# Patient Record
Sex: FEMALE | Race: WHITE | Employment: FULL TIME | URBAN - NONMETROPOLITAN AREA
[De-identification: names, ages, dates, MRNs, and addresses within clinical notes are randomized per-mention and may not be internally consistent; named-entity substitution may affect disease eponyms.]

---
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
## 2019-10-18 ENCOUNTER — HOSPITAL ENCOUNTER (OUTPATIENT)
Age: 19
Discharge: HOME | End: 2019-10-18
Payer: COMMERCIAL

## 2019-10-18 ENCOUNTER — HOSPITAL ENCOUNTER (OUTPATIENT)
Dept: GENERAL RADIOLOGY | Age: 19
Discharge: HOME | End: 2019-10-18
Payer: COMMERCIAL

## 2019-10-18 DIAGNOSIS — R05.9 COUGH: ICD-10-CM

## 2019-10-18 PROCEDURE — 71046 X-RAY EXAM CHEST 2 VIEWS: CPT

## 2020-11-19 ENCOUNTER — HOSPITAL ENCOUNTER (OUTPATIENT)
Age: 20
Setting detail: SPECIMEN
Discharge: HOME | End: 2020-11-19
Payer: COMMERCIAL

## 2020-11-19 PROCEDURE — U0003 INFECTIOUS AGENT DETECTION BY NUCLEIC ACID (DNA OR RNA); SEVERE ACUTE RESPIRATORY SYNDROME CORONAVIRUS 2 (SARS-COV-2) (CORONAVIRUS DISEASE [COVID-19]), AMPLIFIED PROBE TECHNIQUE, MAKING USE OF HIGH THROUGHPUT TECHNOLOGIES AS DESCRIBED BY CMS-2020-01-R: HCPCS

## 2020-11-21 LAB — SARS-COV-2: DETECTED

## 2022-03-31 ENCOUNTER — APPOINTMENT (OUTPATIENT)
Dept: GENERAL RADIOLOGY | Age: 22
End: 2022-03-31
Payer: COMMERCIAL

## 2022-03-31 ENCOUNTER — HOSPITAL ENCOUNTER (EMERGENCY)
Age: 22
Discharge: HOME | End: 2022-03-31
Attending: EMERGENCY MEDICINE
Payer: COMMERCIAL

## 2022-03-31 VITALS
WEIGHT: 180 LBS | HEIGHT: 62 IN | DIASTOLIC BLOOD PRESSURE: 79 MMHG | RESPIRATION RATE: 15 BRPM | TEMPERATURE: 100.5 F | SYSTOLIC BLOOD PRESSURE: 121 MMHG | HEART RATE: 114 BPM | BODY MASS INDEX: 33.13 KG/M2 | OXYGEN SATURATION: 97 %

## 2022-03-31 DIAGNOSIS — R00.0 TACHYCARDIA: ICD-10-CM

## 2022-03-31 DIAGNOSIS — J06.9 VIRAL UPPER RESPIRATORY TRACT INFECTION: Primary | ICD-10-CM

## 2022-03-31 LAB
ALBUMIN SERPL-MCNC: 4.4 G/DL (ref 3.5–5.1)
ALP BLD-CCNC: 64 U/L (ref 38–126)
ALT SERPL-CCNC: 9 U/L (ref 11–66)
AMPHETAMINE+METHAMPHETAMINE URINE SCREEN: NEGATIVE
ANION GAP SERPL CALCULATED.3IONS-SCNC: 15 MEQ/L (ref 8–16)
AST SERPL-CCNC: 17 U/L (ref 5–40)
BACTERIA: ABNORMAL /HPF
BARBITURATE QUANTITATIVE URINE: NEGATIVE
BASOPHILS # BLD: 0.7 %
BASOPHILS ABSOLUTE: 0.1 THOU/MM3 (ref 0–0.1)
BENZODIAZEPINE QUANTITATIVE URINE: NEGATIVE
BILIRUB SERPL-MCNC: 0.6 MG/DL (ref 0.3–1.2)
BILIRUBIN URINE: NEGATIVE
BLOOD, URINE: NEGATIVE
BUN BLDV-MCNC: 15 MG/DL (ref 7–22)
C-REACTIVE PROTEIN: 2.21 MG/DL (ref 0–1)
CALCIUM SERPL-MCNC: 9.8 MG/DL (ref 8.5–10.5)
CANNABINOID QUANTITATIVE URINE: NEGATIVE
CASTS 2: ABNORMAL /LPF
CASTS UA: ABNORMAL /LPF
CHARACTER, URINE: CLEAR
CHLORIDE BLD-SCNC: 104 MEQ/L (ref 98–111)
CO2: 20 MEQ/L (ref 23–33)
COCAINE METABOLITE QUANTITATIVE URINE: NEGATIVE
COLOR: YELLOW
CREAT SERPL-MCNC: 0.9 MG/DL (ref 0.4–1.2)
CRYSTALS, UA: ABNORMAL
EKG ATRIAL RATE: 150 BPM
EKG P AXIS: 63 DEGREES
EKG P-R INTERVAL: 130 MS
EKG Q-T INTERVAL: 328 MS
EKG QRS DURATION: 76 MS
EKG QTC CALCULATION (BAZETT): 518 MS
EKG R AXIS: 83 DEGREES
EKG T AXIS: 49 DEGREES
EKG VENTRICULAR RATE: 150 BPM
EOSINOPHIL # BLD: 0.5 %
EOSINOPHILS ABSOLUTE: 0 THOU/MM3 (ref 0–0.4)
EPITHELIAL CELLS, UA: ABNORMAL /HPF
ERYTHROCYTE [DISTWIDTH] IN BLOOD BY AUTOMATED COUNT: 12.6 % (ref 11.5–14.5)
ERYTHROCYTE [DISTWIDTH] IN BLOOD BY AUTOMATED COUNT: 41.1 FL (ref 35–45)
FLU A ANTIGEN: NEGATIVE
FLU B ANTIGEN: NEGATIVE
GFR SERPL CREATININE-BSD FRML MDRD: 78 ML/MIN/1.73M2
GLUCOSE BLD-MCNC: 115 MG/DL (ref 70–108)
GLUCOSE URINE: NEGATIVE MG/DL
GROUP A STREP CULTURE, REFLEX: NEGATIVE
HCT VFR BLD CALC: 44 % (ref 37–47)
HEMOGLOBIN: 14.8 GM/DL (ref 12–16)
IMMATURE GRANS (ABS): 0.05 THOU/MM3 (ref 0–0.07)
IMMATURE GRANULOCYTES: 0.5 %
KETONES, URINE: NEGATIVE
LEUKOCYTE ESTERASE, URINE: ABNORMAL
LYMPHOCYTES # BLD: 6.9 %
LYMPHOCYTES ABSOLUTE: 0.7 THOU/MM3 (ref 1–4.8)
MCH RBC QN AUTO: 30.3 PG (ref 26–33)
MCHC RBC AUTO-ENTMCNC: 33.6 GM/DL (ref 32.2–35.5)
MCV RBC AUTO: 90 FL (ref 81–99)
MISCELLANEOUS 2: ABNORMAL
MONOCYTES # BLD: 8 %
MONOCYTES ABSOLUTE: 0.8 THOU/MM3 (ref 0.4–1.3)
NITRITE, URINE: NEGATIVE
NUCLEATED RED BLOOD CELLS: 0 /100 WBC
OPIATES, URINE: NEGATIVE
OSMOLALITY CALCULATION: 279.3 MOSMOL/KG (ref 275–300)
OXYCODONE: NEGATIVE
PH UA: 7 (ref 5–9)
PHENCYCLIDINE QUANTITATIVE URINE: NEGATIVE
PLATELET # BLD: 270 THOU/MM3 (ref 130–400)
PMV BLD AUTO: 9.2 FL (ref 9.4–12.4)
POTASSIUM SERPL-SCNC: 3.6 MEQ/L (ref 3.5–5.2)
PREGNANCY, SERUM: NEGATIVE
PROCALCITONIN: 0.04 NG/ML (ref 0.01–0.09)
PROTEIN UA: NEGATIVE
RBC # BLD: 4.89 MILL/MM3 (ref 4.2–5.4)
RBC URINE: ABNORMAL /HPF
REFLEX THROAT C + S: NORMAL
RENAL EPITHELIAL, UA: ABNORMAL
SARS-COV-2, NAAT: NOT  DETECTED
SEDIMENTATION RATE, ERYTHROCYTE: 1 MM/HR (ref 0–20)
SEG NEUTROPHILS: 83.4 %
SEGMENTED NEUTROPHILS ABSOLUTE COUNT: 7.9 THOU/MM3 (ref 1.8–7.7)
SODIUM BLD-SCNC: 139 MEQ/L (ref 135–145)
SPECIFIC GRAVITY, URINE: 1.01 (ref 1–1.03)
TOTAL PROTEIN: 7.5 G/DL (ref 6.1–8)
TROPONIN T: < 0.01 NG/ML
TSH SERPL DL<=0.05 MIU/L-ACNC: 0.89 UIU/ML (ref 0.4–4.2)
UROBILINOGEN, URINE: 0.2 EU/DL (ref 0–1)
WBC # BLD: 9.5 THOU/MM3 (ref 4.8–10.8)
WBC UA: ABNORMAL /HPF
YEAST: ABNORMAL

## 2022-03-31 PROCEDURE — 36415 COLL VENOUS BLD VENIPUNCTURE: CPT

## 2022-03-31 PROCEDURE — 84443 ASSAY THYROID STIM HORMONE: CPT

## 2022-03-31 PROCEDURE — 80307 DRUG TEST PRSMV CHEM ANLYZR: CPT

## 2022-03-31 PROCEDURE — 87070 CULTURE OTHR SPECIMN AEROBIC: CPT

## 2022-03-31 PROCEDURE — 84145 PROCALCITONIN (PCT): CPT

## 2022-03-31 PROCEDURE — 84703 CHORIONIC GONADOTROPIN ASSAY: CPT

## 2022-03-31 PROCEDURE — 96360 HYDRATION IV INFUSION INIT: CPT

## 2022-03-31 PROCEDURE — 81001 URINALYSIS AUTO W/SCOPE: CPT

## 2022-03-31 PROCEDURE — 6370000000 HC RX 637 (ALT 250 FOR IP): Performed by: EMERGENCY MEDICINE

## 2022-03-31 PROCEDURE — 85651 RBC SED RATE NONAUTOMATED: CPT

## 2022-03-31 PROCEDURE — 99285 EMERGENCY DEPT VISIT HI MDM: CPT

## 2022-03-31 PROCEDURE — 93010 ELECTROCARDIOGRAM REPORT: CPT | Performed by: NUCLEAR MEDICINE

## 2022-03-31 PROCEDURE — 86140 C-REACTIVE PROTEIN: CPT

## 2022-03-31 PROCEDURE — 71045 X-RAY EXAM CHEST 1 VIEW: CPT

## 2022-03-31 PROCEDURE — 93005 ELECTROCARDIOGRAM TRACING: CPT | Performed by: EMERGENCY MEDICINE

## 2022-03-31 PROCEDURE — 84484 ASSAY OF TROPONIN QUANT: CPT

## 2022-03-31 PROCEDURE — 87804 INFLUENZA ASSAY W/OPTIC: CPT

## 2022-03-31 PROCEDURE — 96361 HYDRATE IV INFUSION ADD-ON: CPT

## 2022-03-31 PROCEDURE — 87880 STREP A ASSAY W/OPTIC: CPT

## 2022-03-31 PROCEDURE — 87635 SARS-COV-2 COVID-19 AMP PRB: CPT

## 2022-03-31 PROCEDURE — 85025 COMPLETE CBC W/AUTO DIFF WBC: CPT

## 2022-03-31 PROCEDURE — 80053 COMPREHEN METABOLIC PANEL: CPT

## 2022-03-31 PROCEDURE — 87040 BLOOD CULTURE FOR BACTERIA: CPT

## 2022-03-31 PROCEDURE — 2580000003 HC RX 258: Performed by: EMERGENCY MEDICINE

## 2022-03-31 RX ORDER — 0.9 % SODIUM CHLORIDE 0.9 %
2000 INTRAVENOUS SOLUTION INTRAVENOUS ONCE
Status: COMPLETED | OUTPATIENT
Start: 2022-03-31 | End: 2022-03-31

## 2022-03-31 RX ORDER — ACETAMINOPHEN 325 MG/1
650 TABLET ORAL ONCE
Status: COMPLETED | OUTPATIENT
Start: 2022-03-31 | End: 2022-03-31

## 2022-03-31 RX ADMIN — ACETAMINOPHEN 650 MG: 325 TABLET ORAL at 08:57

## 2022-03-31 RX ADMIN — SODIUM CHLORIDE 2000 ML: 9 INJECTION, SOLUTION INTRAVENOUS at 08:56

## 2022-03-31 NOTE — ED NOTES
Patient resting in bed. Respirations easy and unlabored. No distress noted. Call light within reach.        Andrea Zaldivar RN  03/31/22 [id number]

## 2022-03-31 NOTE — ED PROVIDER NOTES
[facility] ENCOUNTER          Pt Name: Aiden Castillo  MRN: [id number]  [place] 2000  Date of evaluation: 3/31/2022  Treating Resident Physician: Gisselle Charlton MD  Supervising Physician: Cierra Oneal MD    [address] [place] [address]       Chief Complaint   Patient presents with    Palpitations     History obtained from chart review and the patient. HISTORY OF PRESENT ILLNESS      Aiden Castillo is a 20 y.o. female w/Hx of COVID-19 infection in 2020 and remote Hx of Lumbago, otherwise unremarkable who presents to the [facility] ED c/o palpitations. Pt states that she woke up this AM around 0600 and was having some lightheadedness and her heart \"felt like lt was racing\". She put her Apple watch on and said her HR was in the 140's, then after she went upstairs it was in the 180's. Pt called her mother and was instructed to come to the ED for evaluation. Of note, Pt states she was having a dry, hacking cough that started last night that is a/w CP/backpain when coughing. Pt felt \"hot\" at home, but was unsure if running a fever. Pt's mother states that the father is at home sick w/similar symptoms of dry cough and fever. Pt denies EtOH or OTC/illicit drug use. Pt denies LOC, HA, blurry vision, N/V/D, dizziness, dysuria, foul smelling urine. REVIEW OF SYSTEMS   Review of Systems   All other systems reviewed and are negative. PAST MEDICAL AND SURGICAL HISTORY   No past medical history on file. No past surgical history on file. MEDICATIONS   No current facility-administered medications for this encounter. No current outpatient medications on file.     SOCIAL HISTORY     Social History     Social History Narrative    Not on file     Social History     Tobacco Use    Smoking status: Not on file    Smokeless tobacco: Not on file   Substance Use Topics    Alcohol use: Not on file    Drug use: Not on file     ALLERGIES   No Known Allergies    FAMILY HISTORY No family history on file. PREVIOUS RECORDS   Previous records reviewed: This is this patient's first visit to [facility] ED, no previous records available on EMR. .    PHYSICAL EXAM     ED Triage Vitals [03/31/22 0822]   BP Temp Temp src Pulse Resp SpO2 Height Weight   (!) 132/92 -- -- 150 16 98 % 5' 2\" (1.575 m) 180 lb (81.6 kg)     Initial vital signs and nursing assessment reviewed and abnormal from . Body mass index is 32.92 kg/m². Pulsoximetry is normal per my interpretation. Additional Vital Signs:  Vitals:    03/31/22 1144   BP:    Pulse: 114   Resp:    Temp:    SpO2: 97%     Physical Exam  Constitutional:       General: She is not in acute distress. Appearance: Normal appearance. She is not ill-appearing, toxic-appearing or diaphoretic. HENT:      Head: Normocephalic and atraumatic. Right Ear: External ear normal.      Left Ear: External ear normal.      Nose: Nose normal. No congestion or rhinorrhea. Mouth/Throat:      Mouth: Mucous membranes are moist.      Pharynx: Oropharynx is clear. No oropharyngeal exudate or posterior oropharyngeal erythema. Eyes:      Extraocular Movements: Extraocular movements intact. Conjunctiva/sclera: Conjunctivae normal.      Pupils: Pupils are equal, round, and reactive to light. Cardiovascular:      Rate and Rhythm: Regular rhythm. Tachycardia present. Pulses: Normal pulses. Heart sounds: Normal heart sounds. No murmur heard. No friction rub. No gallop. Pulmonary:      Effort: Pulmonary effort is normal. No respiratory distress. Breath sounds: Normal breath sounds. No stridor. No wheezing. Abdominal:      General: Abdomen is flat. Bowel sounds are normal.      Palpations: Abdomen is soft. Tenderness: There is no abdominal tenderness. There is no guarding or rebound. Musculoskeletal:         General: No swelling or tenderness. Normal range of motion. Cervical back: Normal range of motion and neck supple.  No tenderness. Lymphadenopathy:      Cervical: No cervical adenopathy. Skin:     General: Skin is warm and dry. Capillary Refill: Capillary refill takes less than 2 seconds. Coloration: Skin is not pale. Findings: No rash. Neurological:      General: No focal deficit present. Mental Status: She is alert and oriented to person, place, and time. Cranial Nerves: No cranial nerve deficit. Sensory: No sensory deficit. Motor: No weakness. Psychiatric:         Mood and Affect: Mood normal.         Behavior: Behavior normal.         Thought Content: Thought content normal.         Judgment: Judgment normal.       MEDICAL DECISION MAKING   Initial Assessment:   1. 23yo F w/unremarkable PMHx presenting for new onset palpitations. Dry cough starting last night, febrile at 100.5. -150 while being evaluated in room resting in bed. SIRS 2/4 (+), Physical exam otherwise unremarkable. Father is at home sick as well w/similar symptoms. DDx include Viral URI, PNA, UTI, POTS/IST, Lisa/Myocaarditis, drugs/EtOH, Dehydration, electrolyte abnormality, Hyperthyroidism, underlying cardiac arrhythmia/issue, less likely PE, but still a possibility. Well's criteria for PE score of 1.5. Plan:    CBC, CMP, TSH w/reflex, troponin, HCG, ESR/CRP   Blood cultures x2, Procal, COVID-19, Influenza A/B, Group A Strep   UA w/reflex   IVF - NS 1L Bolus x2   Tylenol for fever    - Upon re-evaluation after the IVF and labs drawn, pt is resting in bed and doing well. HR trending down into the 110s-120s. Given the absence of apparent triggers, EtOH, nicotine, drugs, labs re-assuring, and pt still remains tachycardic in the 120s-130s, consideration for POTS vs IST comes into the picture. IST seems more likely given a possible unidentified viral URI, lightheadedness, no identifiable trigger, and sustained tachy even at rest/regardless of body position, that has lasted since at least 0600.  She remains tachycardic despite IVF resuscitation w/2L NS, but she is completely Asx. Pt HR trended down into the low 110's and she is stable and safe for d/c home at this time. ED RESULTS   Laboratory results:  Labs Reviewed   CBC WITH AUTO DIFFERENTIAL - Abnormal; Notable for the following components:       Result Value    MPV 9.2 (*)     Segs Absolute 7.9 (*)     Lymphocytes Absolute 0.7 (*)     All other components within normal limits   COMPREHENSIVE METABOLIC PANEL - Abnormal; Notable for the following components:    Glucose 115 (*)     CO2 20 (*)     ALT 9 (*)     All other components within normal limits   GLOMERULAR FILTRATION RATE, ESTIMATED - Abnormal; Notable for the following components:    Est, Glom Filt Rate 78 (*)     All other components within normal limits   URINE WITH REFLEXED MICRO - Abnormal; Notable for the following components:    Leukocyte Esterase, Urine TRACE (*)     All other components within normal limits   C-REACTIVE PROTEIN - Abnormal; Notable for the following components:    CRP 2.21 (*)     All other components within normal limits   RAPID INFLUENZA A/B ANTIGENS   COVID-19, RAPID   CULTURE, BLOOD 1   CULTURE, BLOOD 2   CULTURE, THROAT    Narrative:     Source: Specimen not received       Site:           Current Antibiotics:   GROUP A STREP, REFLEX   PROCALCITONIN   TSH WITH REFLEX   TROPONIN   HCG, SERUM, QUALITATIVE   ANION GAP   OSMOLALITY   URINE DRUG SCREEN   SEDIMENTATION RATE       Radiologic studies results:  XR CHEST PORTABLE   Final Result   Stable radiographic appearance of the chest. No evidence of an acute process. **This report has been created using voice recognition software. It may contain minor errors which are inherent in voice recognition technology. **      Final report electronically signed by Dr. Landon Haney MD on 3/31/2022 9:01 AM          ED Medications administered this visit:   Medications   0.9 % sodium chloride bolus (0 mLs IntraVENous Stopped 3/31/22 1057)   acetaminophen (TYLENOL) tablet 650 mg (650 mg Oral Given 3/31/22 0857)     ED COURSE     ED Course as of 03/31/22 1331   Thu Mar 31, 2022   0902 CBC with Auto Differential(!):    WBC 9.5   RBC 4.89   Hemoglobin Quant 14.8   Hematocrit 44.0   MCV 90.0   MCH 30.3   MCHC 33.6   RDW-CV 12.6   RDW-SD 41.1   Platelet Count 731   MPV 9.2(!)   Seg Neutrophils 83.4   Lymphocytes 6.9   Monocytes 8.0   Eosinophils 0.5   Basophils 0.7   Immature Granulocytes 0.5   Segs Absolute 7.9(!)   Lymphocytes Absolute 0.7(!)   Monocytes Absolute 0.8   Eosinophils Absolute 0.0   Basophils Absolute 0.1   Immature Grans (Abs) 0.05   Nucleated Red Blood Cells 0 [CH]   0911 Comprehensive Metabolic Panel(!):    GLUCOSE, FASTING,(!)   Creatinine 0.9   BUN,BUNPL 15   Sodium 139   Potassium 3.6   Chloride 104   CO2 20(!)   CALCIUM, SERUM, 637316 9.8   AST 17   Alk Phos 64   Total Protein 7.5   Albumin 4.4   Bilirubin 0.6   ALT 9(!) [CH]   0911 Glomerular Filtration Rate, Estimated(!):    Est, Glom Filt Rate 78(!) [CH]   0911 HCG Qualitative, Serum:    Preg, Serum NEGATIVE [CH]   0915 Procalcitonin:    Procalcitonin 0.04 [CH]   0915 Troponin:    Troponin T < 0.010 [CH]   0915 TSH with Reflex:    TSH 0.885 [CH]   1002 Urine with Reflexed Micro(!):    Glucose, UA NEGATIVE   Bilirubin, Urine NEGATIVE   Ketones, Urine NEGATIVE   Specific Gravity, Urine 1.009   Blood, Urine NEGATIVE   pH, UA 7.0   Protein, UA NEGATIVE   Urobilinogen, Urine 0.2   Nitrite, Urine NEGATIVE   Leukocyte Esterase, Urine TRACE(!)   Color, UA YELLOW   Character, Urine CLEAR   RBC, UA 3-5   WBC, UA 2-4   Epithelial Cells, UA 3-5   Bacteria, UA NONE SEEN   Casts UA NONE SEEN   Crystals, UA NONE SEEN   Renal Epithelial, UA NONE SEEN   Yeast, Urine NONE SEEN   CASTS 2 NONE SEEN   MISCELLANEOUS 2 NONE SEEN [CH]   1002 Group A Strep, Reflex:    GROUP A STREP CULTURE, REFLEX Negative [CH]   1002 COVID-19, Rapid:    SARS-CoV-2, NAAT NOT  DETECTED [CH] 1002 Rapid influenza A/B antigens:    Flu A Antigen Negative   Flu B Antigen Negative [CH]   1116 C-Reactive Protein(!):    CRP 2.21(!) [CH]      ED Course User Index  [] Bertis Fabry, MD     Strict return precautions and follow up instructions were discussed with the patient prior to discharge, with which the patient agrees. MEDICATION CHANGES     There are no discharge medications for this patient. FINAL DISPOSITION     Final diagnoses:   Viral upper respiratory tract infection   Tachycardia     Condition: condition: stable  Dispo: Discharge to home      This transcription was electronically signed. Parts of this transcriptions may have been dictated by use of voice recognition software and electronically transcribed, and parts may have been transcribed with the assistance of an ED scribe. The transcription may contain errors not detected in proofreading. Please refer to my supervising physician's documentation if my documentation differs.     Electronically Signed: Bertis Fabry, MD, 03/31/22, 1:31 PM         Bertis Fabry, MD  Resident  03/31/22 9533

## 2022-03-31 NOTE — ED TRIAGE NOTES
Presents to ED with c/o palpitations. States it started at 0615 this morning. Patient put on her apple watch and it said her HR was 180 when she walked up stairs. Alert and oriented. Respirations easy and unlabored. EKG complete. IV in place.

## 2022-03-31 NOTE — Clinical Note
Jose F Garcia was seen and treated in our emergency department on 3/31/2022. She may return to work on 04/01/2022. If you have any questions or concerns, please don't hesitate to call.       Migue Coles MD

## 2022-03-31 NOTE — ED NOTES
Patient resting in bed. Respirations easy and unlabored. No distress noted. Call light within reach.        Robert Johnson RN  03/31/22 6146

## 2022-03-31 NOTE — ED PROVIDER NOTES
PATIENT NAME: Jessica Ortiz  MRN: [id number]  : 2000  BARRIOS: 3/31/2022      I personally saw and examined the patient. I have reviewed and agreed with the resident physician's findings including all diagnostic interpretations and treatment plans as written. Please see resident physician's chart for detailed history of present illness, physical exam and medical decision making. I was present for the key portions of any procedures performed and the inclusive time noted in any critical care statement. MEDICAL DEDISION MAKING (MDM)     Jessica Ortiz is a 20 y.o. female who presents to Emergency Department with Palpitations     Patient was brought in for evaluation of racing heart and low-grade fever. Physical exam shows patient's T is 100.5 Fahrenheit, and he is tachycardic with heart rate 150/min. Lungs clear to auscultation bilaterally. Abdomen is soft. ED work-ups do not reveal patient has serious bacterial infection. Most likely patient is a virus infection. Patient's tachycardia improved after ED treatment including saline bolus and acetaminophen. Patient family reassured. Discharged with PCP follow-up pain 2-3 days.     Medications   0.9 % sodium chloride bolus (0 mLs IntraVENous Stopped 3/31/22 1057)   acetaminophen (TYLENOL) tablet 650 mg (650 mg Oral Given 3/31/22 0857)         Vitals:    22 0942 22 1017 22 1058 22 1144   BP: 120/87 129/76 121/79    Pulse: 130  125 114   Resp: 16 16 15    Temp:       TempSrc:       SpO2: 100% 100% 100% 97%   Weight:       Height:           Labs Reviewed   CBC WITH AUTO DIFFERENTIAL - Abnormal; Notable for the following components:       Result Value    MPV 9.2 (*)     Segs Absolute 7.9 (*)     Lymphocytes Absolute 0.7 (*)     All other components within normal limits   COMPREHENSIVE METABOLIC PANEL - Abnormal; Notable for the following components:    Glucose 115 (*)     CO2 20 (*)     ALT 9 (*)     All other components within normal limits   GLOMERULAR FILTRATION RATE, ESTIMATED - Abnormal; Notable for the following components:    Est, Glom Filt Rate 78 (*)     All other components within normal limits   URINE WITH REFLEXED MICRO - Abnormal; Notable for the following components:    Leukocyte Esterase, Urine TRACE (*)     All other components within normal limits   C-REACTIVE PROTEIN - Abnormal; Notable for the following components:    CRP 2.21 (*)     All other components within normal limits   RAPID INFLUENZA A/B ANTIGENS   COVID-19, RAPID   CULTURE, BLOOD 1   CULTURE, BLOOD 2   CULTURE, THROAT    Narrative:     Source: Specimen not received       Site:           Current Antibiotics:   GROUP A STREP, REFLEX   PROCALCITONIN   TSH WITH REFLEX   TROPONIN   HCG, SERUM, QUALITATIVE   ANION GAP   OSMOLALITY   URINE DRUG SCREEN   SEDIMENTATION RATE       XR CHEST PORTABLE   Final Result   Stable radiographic appearance of the chest. No evidence of an acute process. **This report has been created using voice recognition software. It may contain minor errors which are inherent in voice recognition technology. **      Final report electronically signed by Dr. Kelvin Penn MD on 3/31/2022 9:01 AM            FINAL IMPRESSION AND DISPOSITION      1. Viral upper respiratory tract infection    2. Tachycardia        DISPOSITION Decision To Discharge 03/31/2022 11:41:33 AM        PATIENT REFERRED TO:  Sanford Damon MD  [address]    Schedule an appointment as soon as possible for a visit in 1 week  For ED discharge follow up      DISCHARGE MEDICATIONS:  There are no discharge medications for this patient.       (Please note that portions of this note were completed with a voice recognition program.  Efforts were made to edit the dictations but occasionally words aremis-transcribed.)    MD Alyse Verdugo MD  03/31/22 7613

## 2022-04-02 LAB — THROAT/NOSE CULTURE: NORMAL

## 2022-04-05 LAB
BLOOD CULTURE, ROUTINE: NORMAL
BLOOD CULTURE, ROUTINE: NORMAL

## 2022-05-31 ENCOUNTER — HOSPITAL ENCOUNTER (OUTPATIENT)
Age: 22
Discharge: HOME | End: 2022-05-31
Payer: COMMERCIAL

## 2022-05-31 LAB
ALBUMIN SERPL-MCNC: 4.4 G/DL (ref 3.5–5.1)
ALP BLD-CCNC: 64 U/L (ref 38–126)
ALT SERPL-CCNC: 8 U/L (ref 11–66)
ANION GAP SERPL CALCULATED.3IONS-SCNC: 11 MEQ/L (ref 8–16)
AST SERPL-CCNC: 17 U/L (ref 5–40)
BASOPHILS # BLD: 0.7 %
BASOPHILS ABSOLUTE: 0.1 THOU/MM3 (ref 0–0.1)
BILIRUB SERPL-MCNC: 0.6 MG/DL (ref 0.3–1.2)
BUN BLDV-MCNC: 14 MG/DL (ref 7–22)
CALCIUM SERPL-MCNC: 9.1 MG/DL (ref 8.5–10.5)
CHLORIDE BLD-SCNC: 105 MEQ/L (ref 98–111)
CO2: 22 MEQ/L (ref 23–33)
CREAT SERPL-MCNC: 0.7 MG/DL (ref 0.4–1.2)
EOSINOPHIL # BLD: 1.4 %
EOSINOPHILS ABSOLUTE: 0.1 THOU/MM3 (ref 0–0.4)
ERYTHROCYTE [DISTWIDTH] IN BLOOD BY AUTOMATED COUNT: 12.3 % (ref 11.5–14.5)
ERYTHROCYTE [DISTWIDTH] IN BLOOD BY AUTOMATED COUNT: 40.8 FL (ref 35–45)
GFR SERPL CREATININE-BSD FRML MDRD: > 90 ML/MIN/1.73M2
GLUCOSE BLD-MCNC: 86 MG/DL (ref 70–108)
HCT VFR BLD CALC: 42.5 % (ref 37–47)
HEMOGLOBIN: 14.5 GM/DL (ref 12–16)
IMMATURE GRANS (ABS): 0.01 THOU/MM3 (ref 0–0.07)
IMMATURE GRANULOCYTES: 0.1 %
LYMPHOCYTES # BLD: 29.7 %
LYMPHOCYTES ABSOLUTE: 2.6 THOU/MM3 (ref 1–4.8)
MCH RBC QN AUTO: 30.6 PG (ref 26–33)
MCHC RBC AUTO-ENTMCNC: 34.1 GM/DL (ref 32.2–35.5)
MCV RBC AUTO: 89.7 FL (ref 81–99)
MONOCYTES # BLD: 5.1 %
MONOCYTES ABSOLUTE: 0.4 THOU/MM3 (ref 0.4–1.3)
NUCLEATED RED BLOOD CELLS: 0 /100 WBC
PLATELET # BLD: 325 THOU/MM3 (ref 130–400)
PMV BLD AUTO: 9.3 FL (ref 9.4–12.4)
POTASSIUM SERPL-SCNC: 3.8 MEQ/L (ref 3.5–5.2)
RBC # BLD: 4.74 MILL/MM3 (ref 4.2–5.4)
SARS-COV-2 ANTIBODY, TOTAL: POSITIVE
SEG NEUTROPHILS: 63 %
SEGMENTED NEUTROPHILS ABSOLUTE COUNT: 5.5 THOU/MM3 (ref 1.8–7.7)
SODIUM BLD-SCNC: 138 MEQ/L (ref 135–145)
TOTAL PROTEIN: 6.9 G/DL (ref 6.1–8)
TSH SERPL DL<=0.05 MIU/L-ACNC: 1.56 UIU/ML (ref 0.4–4.2)
WBC # BLD: 8.8 THOU/MM3 (ref 4.8–10.8)

## 2022-05-31 PROCEDURE — 84443 ASSAY THYROID STIM HORMONE: CPT

## 2022-05-31 PROCEDURE — 86769 SARS-COV-2 COVID-19 ANTIBODY: CPT

## 2022-05-31 PROCEDURE — 80053 COMPREHEN METABOLIC PANEL: CPT

## 2022-05-31 PROCEDURE — 85025 COMPLETE CBC W/AUTO DIFF WBC: CPT

## 2022-05-31 PROCEDURE — 36415 COLL VENOUS BLD VENIPUNCTURE: CPT

## 2023-07-19 ENCOUNTER — OFFICE VISIT (OUTPATIENT)
Dept: ENT CLINIC | Age: 23
End: 2023-07-19
Payer: COMMERCIAL

## 2023-07-19 VITALS
BODY MASS INDEX: 34.54 KG/M2 | RESPIRATION RATE: 16 BRPM | SYSTOLIC BLOOD PRESSURE: 110 MMHG | TEMPERATURE: 97.3 F | DIASTOLIC BLOOD PRESSURE: 80 MMHG | WEIGHT: 187.7 LBS | OXYGEN SATURATION: 98 % | HEIGHT: 62 IN | HEART RATE: 98 BPM

## 2023-07-19 DIAGNOSIS — J31.0 RHINITIS MEDICAMENTOSA: Primary | ICD-10-CM

## 2023-07-19 DIAGNOSIS — T48.5X5A RHINITIS MEDICAMENTOSA: Primary | ICD-10-CM

## 2023-07-19 PROCEDURE — 99203 OFFICE O/P NEW LOW 30 MIN: CPT | Performed by: NURSE PRACTITIONER

## 2023-07-19 RX ORDER — FLUTICASONE PROPIONATE 50 MCG
1 SPRAY, SUSPENSION (ML) NASAL DAILY
Qty: 16 G | Refills: 2 | Status: SHIPPED | OUTPATIENT
Start: 2023-07-19

## 2023-07-19 RX ORDER — FAMOTIDINE 40 MG/1
TABLET, FILM COATED ORAL
COMMUNITY
Start: 2023-06-19

## 2023-07-19 RX ORDER — NORETHINDRONE ACETATE AND ETHINYL ESTRADIOL 1MG-20(21)
1 KIT ORAL DAILY
COMMUNITY

## 2023-07-19 NOTE — PATIENT INSTRUCTIONS
Avoid topical decongestant sprays, Vicks, menthol cough drops and prolonged strong oral decongestants. The patient should use the topical steroid nasal spray for a few days, then try to stop the addicting decongestant medication. If needed to sleep, they may use Afrin as little as possible, on one side only, until the other side opens up. At that point, stop the Afrin altogether and continue the steroid spray, and keep the return visit. Sometimes Breathe-right strips may help. For example, the first 3 days:    Morning: Saline spray, blow out nose, Flonase one spray each nostril, Afrin as you usually use    Throughout the day: Afrin if you only need it    Evening/bedtime: Saline spray, blow out nose, Flonase one spray each nostril, Afrin as you usually use    In the following days, same routine as the first 3 days and use the Afrin less often. After that, go down to only using the Afrin in one nostril (keep the Saline/Flonase routine the same).

## 2024-05-13 ENCOUNTER — HOSPITAL ENCOUNTER (OUTPATIENT)
Dept: MRI IMAGING | Age: 24
Discharge: HOME | End: 2024-05-13
Attending: ORTHOPAEDIC SURGERY
Payer: COMMERCIAL

## 2024-05-13 DIAGNOSIS — M79.644 THUMB PAIN, RIGHT: ICD-10-CM

## 2024-05-13 PROCEDURE — 73218 MRI UPPER EXTREMITY W/O DYE: CPT
